# Patient Record
Sex: FEMALE | Race: WHITE | NOT HISPANIC OR LATINO | Employment: OTHER | URBAN - METROPOLITAN AREA
[De-identification: names, ages, dates, MRNs, and addresses within clinical notes are randomized per-mention and may not be internally consistent; named-entity substitution may affect disease eponyms.]

---

## 2024-02-16 ENCOUNTER — OFFICE VISIT (OUTPATIENT)
Dept: UROLOGY | Facility: CLINIC | Age: 69
End: 2024-02-16
Payer: COMMERCIAL

## 2024-02-16 ENCOUNTER — APPOINTMENT (OUTPATIENT)
Dept: UROLOGY | Facility: CLINIC | Age: 69
End: 2024-02-16
Payer: COMMERCIAL

## 2024-02-16 DIAGNOSIS — N18.2 STAGE 2 CHRONIC KIDNEY DISEASE: ICD-10-CM

## 2024-02-16 DIAGNOSIS — N11.8 XANTHOGRANULOMATOUS PYELONEPHRITIS: Primary | ICD-10-CM

## 2024-02-16 PROCEDURE — 1160F RVW MEDS BY RX/DR IN RCRD: CPT | Performed by: UROLOGY

## 2024-02-16 PROCEDURE — 1126F AMNT PAIN NOTED NONE PRSNT: CPT | Performed by: UROLOGY

## 2024-02-16 PROCEDURE — 99213 OFFICE O/P EST LOW 20 MIN: CPT | Performed by: UROLOGY

## 2024-02-16 PROCEDURE — 1159F MED LIST DOCD IN RCRD: CPT | Performed by: UROLOGY

## 2024-02-16 NOTE — PROGRESS NOTES
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 Diaphragm injury repaired primarily. Removed kidney without getting near colon or adjacent structures. Drain left in place.  Postop course: She was discharged on postop day 5/6, had C. difficile colitis, otherwise uncomplicated postop course.   CT 12/9: Very large right retroperitoneal abscess, possible involvement of hepatic flexure consistent with prior studies  Drain placed on 12/10 with purulent output  FUV 12/20: Doing well on antibiotics with drain in place. Drain was closed. I opened and drained approximately 100 cc of hemopurulent material  Underwent evaluation for colonic fistula - unlikely fistula. Sinogram was not great quality. No abnormality on colonoscopy, no air in scan.   Plan after discussing w/ IR, colorectal, ID is to reassess drain output - if remains purulent w/ signs/symptoms of infection then will take her for a RP washout via cheronit w/ Dr. Painter available  Wound culture 3/2+ for Proteus and corynebacterium  FUV 3/21 - persistent purulent drainage from drain off antibiotics.  OR 4/21 -chevron incision, mobilized entire right colon, excised all retroperitoneal fat drained abscess and washed out extensively. Small area suspicious for a fistula was excised and the colon was oversewn. There is a dense adhesion of the cecum to the right lower quadrant which required oversewing as well with Dr. 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 She has persistent postoperative abscesses, failed drain removal postoperatively and recurred with a large abscess s/p drain replacement 07/6/22.  Creatinine 0.77  Albumin 2.6     OR 1/25/23 - for residual stones in ureteral stump. Cleared several large stones, soft. No stent left. Drain replaced RLQ. RP abscess pockets were collapsed, no large pockets. Pigtail catheter placed.   CT 2/6/2023-significant clearance of stone burden, however there remains a calcification in the retroperitoneum concerning for stone and residual stone, I could not access in retrograde fashion.     2/13/20236148-[id number] visit-remains afebrile. No drain output. Minimal drainage around the tube. No postoperative pain.  -Drain removed     FUV 8/14/23 - Appetite back to normal, no fevers chills. Gaining weight. Feeling like her normal self.     UPDATED SUBJECTIVE HISTORY  02/16/24 -patient feels she is back to normal, she has gained weight, appetite is strong.  No recurrent fevers or chills, no drainage.    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    Surgical History  She has a past surgical history that includes Other surgical history (09/24/2021); Other surgical history (10/19/2021); Other surgical history (03/02/2022); Other surgical history (12/29/2021); CT guided percutaneous peritoneal or retroperitoneal fluid collection drainage (12/10/2021); FL guided abscess fluid collection drainage (5/16/2022); FL  guided percutaneous peritoneal or retroperitoneal fluid collection drainage (1/12/2023); FL guided abscess fluid collection drainage (1/12/2023); FL guided abscess fluid collection drainage (7/6/2022); FL guided percutaneous peritoneal or retroperitoneal fluid collection drainage (11/23/2022); and FL guided abscess fluid collection drainage (11/23/2022).     Social History  She has no history on file for tobacco use, alcohol use, and drug use.    Family History  No family history on file.     Allergies  Patient has no known allergies.    ROS: 12 system review was completed and is negative with the exception of those signs and symptoms noted in the history of present illness: A 12 system review was completed and is negative with the exception of those signs and symptoms noted in the history of present illness.     Exam:  General: in NAD, appears stated age  Head: normocephalic, atraumatic  Respiratory: normal effort, no use of accessory muscles  Cardiovascular: no edema noted  Skin: normal turgor, no rashes  Neurologic: grossly intact, oriented to person/place/time  Psychiatric: mode and affect appropriate  Abdomen: Scars well-healed, no drainage of pus, no erythema, no tenderness to palpation     Last Recorded Vitals  There were no vitals taken for this visit.    Lab Results   Component Value Date    CREATININE 0.58 10/13/2022    HGB 12.9 10/13/2022         ASSESSMENT/PLAN:  # Xanthogranulomatous pyelonephritis  -Resolved  -Follow-up in 1 year    # CKD  -Overall good renal function however I suspect a size statin C would be more accurate for estimation of GFR given her nonweightbearing status  -Regardless, she is following with Dr. Marce Garvin MD

## 2025-02-17 ENCOUNTER — APPOINTMENT (OUTPATIENT)
Dept: UROLOGY | Facility: CLINIC | Age: 70
End: 2025-02-17
Payer: COMMERCIAL